# Patient Record
Sex: FEMALE | Race: BLACK OR AFRICAN AMERICAN | Employment: UNEMPLOYED | URBAN - METROPOLITAN AREA
[De-identification: names, ages, dates, MRNs, and addresses within clinical notes are randomized per-mention and may not be internally consistent; named-entity substitution may affect disease eponyms.]

---

## 2022-11-23 ENCOUNTER — HOSPITAL ENCOUNTER (EMERGENCY)
Age: 22
Discharge: HOME | End: 2022-11-23
Admitting: STUDENT IN AN ORGANIZED HEALTH CARE EDUCATION/TRAINING PROGRAM
Payer: COMMERCIAL

## 2022-11-23 ENCOUNTER — HOSPITAL ENCOUNTER (EMERGENCY)
Age: 22
Discharge: HOME | End: 2022-11-23
Attending: STUDENT IN AN ORGANIZED HEALTH CARE EDUCATION/TRAINING PROGRAM | Admitting: STUDENT IN AN ORGANIZED HEALTH CARE EDUCATION/TRAINING PROGRAM
Payer: COMMERCIAL

## 2022-11-23 VITALS
HEIGHT: 65 IN | DIASTOLIC BLOOD PRESSURE: 81 MMHG | WEIGHT: 160 LBS | SYSTOLIC BLOOD PRESSURE: 121 MMHG | BODY MASS INDEX: 26.66 KG/M2 | HEART RATE: 83 BPM | OXYGEN SATURATION: 100 % | RESPIRATION RATE: 18 BRPM | TEMPERATURE: 97.8 F

## 2022-11-23 VITALS
TEMPERATURE: 98 F | RESPIRATION RATE: 16 BRPM | HEIGHT: 65 IN | WEIGHT: 160 LBS | SYSTOLIC BLOOD PRESSURE: 131 MMHG | DIASTOLIC BLOOD PRESSURE: 85 MMHG | BODY MASS INDEX: 26.66 KG/M2 | HEART RATE: 84 BPM | OXYGEN SATURATION: 100 %

## 2022-11-23 DIAGNOSIS — S09.90XA MINOR HEAD INJURY, INITIAL ENCOUNTER: ICD-10-CM

## 2022-11-23 DIAGNOSIS — S40.022A CONTUSION OF BOTH UPPER EXTREMITIES, INITIAL ENCOUNTER: ICD-10-CM

## 2022-11-23 DIAGNOSIS — R51.9 NONINTRACTABLE HEADACHE, UNSPECIFIED CHRONICITY PATTERN, UNSPECIFIED HEADACHE TYPE: ICD-10-CM

## 2022-11-23 DIAGNOSIS — M54.9 ACUTE BACK PAIN, UNSPECIFIED BACK LOCATION, UNSPECIFIED BACK PAIN LATERALITY: ICD-10-CM

## 2022-11-23 DIAGNOSIS — S00.83XA FACIAL CONTUSION, INITIAL ENCOUNTER: ICD-10-CM
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
## 2022-11-23 DIAGNOSIS — S40.021A CONTUSION OF BOTH UPPER EXTREMITIES, INITIAL ENCOUNTER: ICD-10-CM

## 2022-11-23 PROCEDURE — 74011250637 HC RX REV CODE- 250/637: Performed by: NURSE PRACTITIONER

## 2022-11-23 PROCEDURE — 75810000275 HC EMERGENCY DEPT VISIT NO LEVEL OF CARE: Performed by: STUDENT IN AN ORGANIZED HEALTH CARE EDUCATION/TRAINING PROGRAM

## 2022-11-23 PROCEDURE — 99284 EMERGENCY DEPT VISIT MOD MDM: CPT

## 2022-11-23 PROCEDURE — 99283 EMERGENCY DEPT VISIT LOW MDM: CPT

## 2022-11-23 RX ORDER — IBUPROFEN 600 MG/1
600 TABLET ORAL
Status: COMPLETED | OUTPATIENT
Start: 2022-11-23 | End: 2022-11-23

## 2022-11-23 RX ORDER — METHOCARBAMOL 750 MG/1
750 TABLET, FILM COATED ORAL
Qty: 20 TABLET | Refills: 0 | Status: SHIPPED | OUTPATIENT
Start: 2022-11-23

## 2022-11-23 RX ORDER — CYCLOBENZAPRINE HCL 10 MG
10 TABLET ORAL
Status: COMPLETED | OUTPATIENT
Start: 2022-11-23 | End: 2022-11-23

## 2022-11-23 RX ORDER — LIDOCAINE 50 MG/G
PATCH TOPICAL
Qty: 10 EACH | Refills: 0 | Status: SHIPPED | OUTPATIENT
Start: 2022-11-23

## 2022-11-23 RX ORDER — IBUPROFEN 600 MG/1
600 TABLET ORAL
Qty: 20 TABLET | Refills: 0 | Status: SHIPPED | OUTPATIENT
Start: 2022-11-23

## 2022-11-23 RX ADMIN — IBUPROFEN 600 MG: 600 TABLET, FILM COATED ORAL at 01:42

## 2022-11-23 RX ADMIN — CYCLOBENZAPRINE 10 MG: 10 TABLET, FILM COATED ORAL at 01:42

## 2022-11-23 NOTE — Clinical Note
[facility] EMERGENCY DEPT  [address] [place] [address] Tab [id number]  [phone]    Work/School Note    Date: 11/23/2022    To Whom It May concern:    Christin Tapia was seen and treated today in the emergency room by the following provider(s):  Nurse Practitioner: Nguyễn Espinoza NP. Christin Tapia is excused from work/school on 11/23/2022 through 11/25/2022. She is medically clear to return to work/school on 11/26/2022.          Sincerely,          Nilam Padron NP

## 2022-11-23 NOTE — Clinical Note
[facility] EMERGENCY DEPT  [address] [place] [id number] [phone]    Work/School Note    Date: 11/23/2022    To Whom It May concern:    Angel Garcia was seen and treated today in the emergency room by the following provider(s):  Attending Provider: Clarisa Rahman MD.      Angel Garcia is excused from work/school on 11/23/22 and 11/24/22. She is medically clear to return to work/school on 11/25/2022.        Sincerely,          Colletta Senegal, MD
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
